# Patient Record
Sex: MALE | Race: WHITE | URBAN - METROPOLITAN AREA
[De-identification: names, ages, dates, MRNs, and addresses within clinical notes are randomized per-mention and may not be internally consistent; named-entity substitution may affect disease eponyms.]

---

## 2021-07-23 ENCOUNTER — PROCEDURE VISIT (OUTPATIENT)
Dept: NEUROLOGY | Facility: CLINIC | Age: 26
End: 2021-07-23
Payer: COMMERCIAL

## 2021-07-23 ENCOUNTER — OFFICE VISIT (OUTPATIENT)
Dept: SURGERY | Facility: CLINIC | Age: 26
End: 2021-07-23
Payer: COMMERCIAL

## 2021-07-23 ENCOUNTER — OFFICE VISIT (OUTPATIENT)
Dept: NEUROLOGY | Facility: CLINIC | Age: 26
End: 2021-07-23
Payer: COMMERCIAL

## 2021-07-23 VITALS
WEIGHT: 169 LBS | DIASTOLIC BLOOD PRESSURE: 72 MMHG | HEIGHT: 67 IN | SYSTOLIC BLOOD PRESSURE: 116 MMHG | BODY MASS INDEX: 26.53 KG/M2

## 2021-07-23 VITALS — HEART RATE: 72 BPM | SYSTOLIC BLOOD PRESSURE: 116 MMHG | DIASTOLIC BLOOD PRESSURE: 54 MMHG

## 2021-07-23 DIAGNOSIS — R29.898 WEAKNESS OF BOTH LOWER EXTREMITIES: Primary | ICD-10-CM

## 2021-07-23 DIAGNOSIS — R29.898 LEG WEAKNESS, BILATERAL: ICD-10-CM

## 2021-07-23 DIAGNOSIS — M47.14 THORACIC MYELOPATHY: ICD-10-CM

## 2021-07-23 DIAGNOSIS — G95.9 CERVICAL MYELOPATHY WITH CERVICAL RADICULOPATHY (HCC): ICD-10-CM

## 2021-07-23 DIAGNOSIS — R20.2 NUMBNESS AND TINGLING OF FOOT: ICD-10-CM

## 2021-07-23 DIAGNOSIS — M48.062 SPINAL STENOSIS OF LUMBAR REGION WITH NEUROGENIC CLAUDICATION: ICD-10-CM

## 2021-07-23 DIAGNOSIS — R26.9 NEUROLOGIC GAIT DYSFUNCTION: ICD-10-CM

## 2021-07-23 DIAGNOSIS — R20.0 NUMBNESS AND TINGLING OF FOOT: ICD-10-CM

## 2021-07-23 DIAGNOSIS — G61.81 CIDP (CHRONIC INFLAMMATORY DEMYELINATING POLYNEUROPATHY) (HCC): Primary | ICD-10-CM

## 2021-07-23 DIAGNOSIS — M54.12 CERVICAL MYELOPATHY WITH CERVICAL RADICULOPATHY (HCC): ICD-10-CM

## 2021-07-23 PROCEDURE — 99204 OFFICE O/P NEW MOD 45 MIN: CPT | Performed by: OTHER

## 2021-07-23 PROCEDURE — 99205 OFFICE O/P NEW HI 60 MIN: CPT | Performed by: PHYSICIAN ASSISTANT

## 2021-07-23 PROCEDURE — 3074F SYST BP LT 130 MM HG: CPT | Performed by: PHYSICIAN ASSISTANT

## 2021-07-23 PROCEDURE — 3008F BODY MASS INDEX DOCD: CPT | Performed by: PHYSICIAN ASSISTANT

## 2021-07-23 PROCEDURE — 3078F DIAST BP <80 MM HG: CPT | Performed by: OTHER

## 2021-07-23 PROCEDURE — 3074F SYST BP LT 130 MM HG: CPT | Performed by: OTHER

## 2021-07-23 PROCEDURE — 3078F DIAST BP <80 MM HG: CPT | Performed by: PHYSICIAN ASSISTANT

## 2021-07-23 PROCEDURE — 95886 MUSC TEST DONE W/N TEST COMP: CPT | Performed by: OTHER

## 2021-07-23 PROCEDURE — 95910 NRV CNDJ TEST 7-8 STUDIES: CPT | Performed by: OTHER

## 2021-07-23 RX ORDER — MIRTAZAPINE 15 MG/1
15 TABLET, FILM COATED ORAL NIGHTLY
COMMUNITY
Start: 2021-02-24

## 2021-07-23 RX ORDER — GABAPENTIN 100 MG/1
100 CAPSULE ORAL
COMMUNITY

## 2021-07-23 RX ORDER — CINACALCET 30 MG/1
1 TABLET, FILM COATED ORAL
COMMUNITY
Start: 2021-01-21

## 2021-07-23 RX ORDER — SERTRALINE HYDROCHLORIDE 100 MG/1
TABLET, FILM COATED ORAL
COMMUNITY
Start: 2020-07-07

## 2021-07-23 RX ORDER — MYCOPHENOLIC ACID 360 MG/1
1 TABLET, DELAYED RELEASE ORAL
COMMUNITY
Start: 2020-08-10

## 2021-07-23 RX ORDER — POTASSIUM CHLORIDE 20 MEQ/1
TABLET, EXTENDED RELEASE ORAL
COMMUNITY
Start: 2019-08-25

## 2021-07-23 RX ORDER — SEVELAMER CARBONATE 800 MG/1
3 TABLET, FILM COATED ORAL
COMMUNITY
Start: 2020-09-11

## 2021-07-23 RX ORDER — PREDNISONE 20 MG/1
60 TABLET ORAL DAILY
Qty: 90 TABLET | Refills: 1 | Status: SHIPPED | OUTPATIENT
Start: 2021-07-23 | End: 2021-09-13

## 2021-07-23 RX ORDER — ARIPIPRAZOLE 10 MG/1
TABLET ORAL
COMMUNITY
Start: 2020-06-07

## 2021-07-23 RX ORDER — FAMOTIDINE 20 MG/1
1 TABLET ORAL
COMMUNITY
Start: 2021-01-19

## 2021-07-23 RX ORDER — BUSPIRONE HYDROCHLORIDE 15 MG/1
TABLET ORAL
COMMUNITY
Start: 2019-07-02

## 2021-07-23 RX ORDER — CALCITRIOL 0.25 UG/1
0.5 CAPSULE, LIQUID FILLED ORAL DAILY
COMMUNITY
Start: 2020-11-23

## 2021-07-23 RX ORDER — BUPROPION HYDROCHLORIDE 300 MG/1
300 TABLET ORAL DAILY
COMMUNITY
Start: 2021-06-22
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
## 2021-07-23 NOTE — PROGRESS NOTES
Patient states legs, knees and feet are very weak. He is using a cane. This started in April 2021 after his first COVID shot. N/t in toes and toes are very sensitive. Denies pain. He is falling 3-5 times per day. He has not had any imaging done.

## 2021-07-23 NOTE — PROGRESS NOTES
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  He has a lot of instability of closing his eyes in the shower. Current Outpatient Medications:   •  predniSONE 20 MG Oral Tab, Take 3 tablets (60 mg total) by mouth daily. , Disp: 90 tablet, Rfl: 1  •  ARIPiprazole 10 MG Oral Tab, Take by malcolm List:     CIDP (chronic inflammatory demyelinating polyneuropathy) (HCC)     Leg weakness, bilateral      Objective:   07/23/21  1110   BP: 116/54   Pulse: 72   , There is no height or weight on file to calculate BMI.     Review of Systems   General: no acu KAPPA/LAMBDA QUANT FREE LIGHT CHAINS W/RATIO,SERUM; Future  - EMG ([facility] [place])  - PHYSICAL THERAPY EXTERNAL  - VITAMIN B12; Future  - MRI SPINE LUMBAR (W+WO) (CPT=94567); Future    2.  Leg weakness, bilateral  - MRI SPINE LUMBAR

## 2021-07-23 NOTE — PATIENT INSTRUCTIONS
Refill policies:    • Allow 2-3 business days for refills; controlled substances may take longer.   • Contact your pharmacy at least 5 days prior to running out of medication and have them send an electronic request or submit request through the “request re Depending on your insurance carrier, approval may take 3-10 days. It is highly recommended patients contact their insurance carrier directly to determine coverage.   If test is done without insurance authorization, patient may be responsible for the entire given appointment at 36 today in [place] to see our neurologist  -He may need a EMG of the lower extremities.

## 2021-07-23 NOTE — PATIENT INSTRUCTIONS
Refill policies:    • Allow 2-3 business days for refills; controlled substances may take longer.   • Contact your pharmacy at least 5 days prior to running out of medication and have them send an electronic request or submit request through the “request re Depending on your insurance carrier, approval may take 3-10 days. It is highly recommended patients contact their insurance carrier directly to determine coverage.   If test is done without insurance authorization, patient may be responsible for the entire inflammatory demyelinating polyradiculoneuropathy (AIDP). It is a neurological disorder in which the body's immune system attacks the peripheral nervous system. This is the part of the nervous system outside the brain and spinal cord.  The onset of GBS can Your blood pressure and heart function are affected. GBS is a medical emergency and needs to be treated promptly. Symptoms can become life-threatening. And even with the best care, there is a small chance that a person may die.  But most people have nearly Plasmapheresis is a procedure that removes the plasma (liquid part of the blood) and replaces it with other fluids. Antibodies are also removed with the plasma, which is thought to help reduce the symptoms of the disease.   Immunoglobulin is a blood produ results could mean. Know what to expect if you do not take the medicine or have the test or procedure. If you have a follow-up appointment, write down the date, time, and purpose for that visit.   Know how you can contact your provider if you have questio

## 2021-07-27 NOTE — PROCEDURES
[facility] with [address]  [phone]    Fax  [phone]    Electrophysiologic Consultation      Full Name:    Osmel Sequeira evidence of a right-sided lumbosacral radiculopathy. Motor NCS      Nerve / Sites Rec.  Site Lat Amp Rel Amp Segments Dist Velocity     ms mV %  cm m/s   R COMM PERONEAL - EDB      Ankle EDB NR NR NR Ankle - EDB 8    L COMM PERONEAL - EDB      Ankle E

## 2021-07-29 ENCOUNTER — TELEPHONE (OUTPATIENT)
Dept: NEUROLOGY | Facility: CLINIC | Age: 26
End: 2021-07-29

## 2021-07-29 DIAGNOSIS — G61.81 CIDP (CHRONIC INFLAMMATORY DEMYELINATING POLYNEUROPATHY) (HCC): Primary | ICD-10-CM

## 2021-07-29 NOTE — TELEPHONE ENCOUNTER
DOS 07/28/21  Hemoglobin A 1c  Protein  Vitamin B12  Lab Result Placed on the Nurses Bin   A Copy was sent to Scan

## 2021-07-29 NOTE — TELEPHONE ENCOUNTER
Received labs from [facility]. Copy made and sent to scan. Original placed in nurses bin. Please advise.

## 2021-07-29 NOTE — TELEPHONE ENCOUNTER
DOS:  7/28/21  Protein Electrophoresis Path Review  Immunofixation  Protein Electrophoresis,Serum  Lab results placed in nurses bin for review.   Copy to scanning

## 2021-07-29 NOTE — TELEPHONE ENCOUNTER
Results received. Hemoglobin A 1 C = 5.3    Vitamin B12 = 299    Total Protein = 5.4    Placed on provider desk for review.

## 2021-07-30 NOTE — TELEPHONE ENCOUNTER
RN spoke to Modustri (ok per HIPPA) and was informed she went to schedule the MRI's and was not able to.      RN to call Neurosurgery and cancel Dr. Haydee Zapien orders so we can order the MRI's without contrast.     Jackie beyer requested we call and leave a m

## 2021-07-30 NOTE — TELEPHONE ENCOUNTER
RN LM for mom that the MRI's are ordered with out contrast. Advised we close at 1:00 and can call the [facility] office or call on Monday morning with any questions.

## 2021-08-06 ENCOUNTER — HOSPITAL ENCOUNTER (OUTPATIENT)
Dept: MRI IMAGING | Age: 26
Discharge: HOME | End: 2021-08-06
Attending: Other
Payer: COMMERCIAL

## 2021-08-06 DIAGNOSIS — G61.81 CIDP (CHRONIC INFLAMMATORY DEMYELINATING POLYNEUROPATHY) (HCC): ICD-10-CM

## 2021-08-06 PROCEDURE — 70551 MRI BRAIN STEM W/O DYE: CPT | Performed by: OTHER

## 2021-08-12 ENCOUNTER — HOSPITAL ENCOUNTER (OUTPATIENT)
Dept: MRI IMAGING | Facility: HOSPITAL | Age: 26
Discharge: HOME | End: 2021-08-12
Attending: Other
Payer: COMMERCIAL

## 2021-08-12 DIAGNOSIS — G61.81 CIDP (CHRONIC INFLAMMATORY DEMYELINATING POLYNEUROPATHY) (HCC): ICD-10-CM

## 2021-08-12 PROCEDURE — 72148 MRI LUMBAR SPINE W/O DYE: CPT | Performed by: OTHER

## 2021-08-12 PROCEDURE — 72141 MRI NECK SPINE W/O DYE: CPT | Performed by: OTHER

## 2021-08-12 PROCEDURE — 72146 MRI CHEST SPINE W/O DYE: CPT | Performed by: OTHER

## 2021-08-13 ENCOUNTER — OFFICE VISIT (OUTPATIENT)
Dept: NEUROLOGY | Facility: CLINIC | Age: 26
End: 2021-08-13
Payer: COMMERCIAL

## 2021-08-13 VITALS
RESPIRATION RATE: 16 BRPM | HEART RATE: 78 BPM | DIASTOLIC BLOOD PRESSURE: 65 MMHG | BODY MASS INDEX: 26.53 KG/M2 | WEIGHT: 169 LBS | HEIGHT: 67 IN | SYSTOLIC BLOOD PRESSURE: 122 MMHG

## 2021-08-13 DIAGNOSIS — G61.81 CIDP (CHRONIC INFLAMMATORY DEMYELINATING POLYNEUROPATHY) (HCC): Primary | ICD-10-CM

## 2021-08-13 PROCEDURE — 3074F SYST BP LT 130 MM HG: CPT | Performed by: OTHER

## 2021-08-13 PROCEDURE — 3078F DIAST BP <80 MM HG: CPT | Performed by: OTHER

## 2021-08-13 PROCEDURE — 3008F BODY MASS INDEX DOCD: CPT | Performed by: OTHER

## 2021-08-13 PROCEDURE — 99214 OFFICE O/P EST MOD 30 MIN: CPT | Performed by: OTHER
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
## 2021-09-13 RX ORDER — PREDNISONE 20 MG/1
TABLET ORAL
Qty: 90 TABLET | Refills: 1 | Status: SHIPPED | OUTPATIENT
Start: 2021-09-13 | End: 2021-11-08

## 2021-09-13 NOTE — TELEPHONE ENCOUNTER
Medication: Prednisone 20 mg    Date of last refill:07/23/21 with 1 addt refill  [place]     Last office visit:08/13/21  Due back to clinic per last office note:  RTN in 3 months  Date next office visit scheduled:     Fut

## 2021-10-07 ENCOUNTER — TELEPHONE (OUTPATIENT)
Dept: NEUROLOGY | Facility: CLINIC | Age: 26
End: 2021-10-07

## 2021-10-07 NOTE — TELEPHONE ENCOUNTER
RN spoke to the patient and advised him he should receive the Flu shot. Pt verbalized understanding and did not have any further questions.

## 2021-10-07 NOTE — TELEPHONE ENCOUNTER
Patient is concerned about his recent history of adverse reactions to the Covid vaccine- wondering if he should receive the flu shot. Patient stated 1st Covid Vaccine his legs became weak and after the 2nd Covid Vaccine the legs became even weaker. Please advise if the patient should receive the flu shot.

## 2021-10-07 NOTE — TELEPHONE ENCOUNTER
Pt had a reaction to the COVID vaccine and is asking if it's ok to get the flu shot?     His nephrologist recommended he check with neurology first.

## 2021-11-08 DIAGNOSIS — G61.81 CIDP (CHRONIC INFLAMMATORY DEMYELINATING POLYNEUROPATHY) (HCC): Primary | ICD-10-CM

## 2021-11-08 RX ORDER — PREDNISONE 20 MG/1
TABLET ORAL
Qty: 90 TABLET | Refills: 1 | Status: SHIPPED | OUTPATIENT
Start: 2021-11-08

## 2021-11-08 NOTE — TELEPHONE ENCOUNTER
Medication: prednisone 20 mg oral tab     Date of last refill: 9/13/2021 (#90/1)  [place]     Last office visit: 8/13/2021 for   Due back to clinic per last office note:  3 mos  Date next office visit scheduled:     [place]

## 2021-11-12 ENCOUNTER — OFFICE VISIT (OUTPATIENT)
Dept: NEUROLOGY | Facility: CLINIC | Age: 26
End: 2021-11-12
Payer: COMMERCIAL

## 2021-11-12 VITALS
RESPIRATION RATE: 16 BRPM | WEIGHT: 169 LBS | HEIGHT: 67 IN | SYSTOLIC BLOOD PRESSURE: 120 MMHG | HEART RATE: 76 BPM | DIASTOLIC BLOOD PRESSURE: 67 MMHG | BODY MASS INDEX: 26.53 KG/M2

## 2021-11-12 DIAGNOSIS — G61.81 CIDP (CHRONIC INFLAMMATORY DEMYELINATING POLYNEUROPATHY) (HCC): Primary | ICD-10-CM

## 2021-11-12 PROCEDURE — 3074F SYST BP LT 130 MM HG: CPT | Performed by: OTHER

## 2021-11-12 PROCEDURE — 3078F DIAST BP <80 MM HG: CPT | Performed by: OTHER

## 2021-11-12 PROCEDURE — 99214 OFFICE O/P EST MOD 30 MIN: CPT | Performed by: OTHER

## 2021-11-12 PROCEDURE — 3008F BODY MASS INDEX DOCD: CPT | Performed by: OTHER
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
## 2021-12-07 ENCOUNTER — PROCEDURE VISIT (OUTPATIENT)
Dept: NEUROLOGY | Facility: CLINIC | Age: 26
End: 2021-12-07
Payer: COMMERCIAL

## 2021-12-07 PROCEDURE — 95911 NRV CNDJ TEST 9-10 STUDIES: CPT | Performed by: OTHER

## 2021-12-07 NOTE — PROGRESS NOTES
[facility] with [address]  [phone]      fax  [phone]      Patient: Leland Graham Height: 5 feet 7 inch  Patient ID: [id number] 38.5                 Sensory NCS      Nerve / Sites OnsetLat P-P Amp Resp. Dist Khai    ms µV  cm m/s   R RADIAL - Thumb   1. Forearm 1.95 36.5  10 51.3   2.  1.80 36.8  10 666.7   R SURAL - Lat Mall   1.  Calf 4.00 9.8      2.  4.00 8.0      3.  3.95 8.3

## 2021-12-10 ENCOUNTER — OFFICE VISIT (OUTPATIENT)
Dept: NEUROLOGY | Facility: CLINIC | Age: 26
End: 2021-12-10
Payer: COMMERCIAL

## 2021-12-10 VITALS
HEART RATE: 88 BPM | RESPIRATION RATE: 16 BRPM | DIASTOLIC BLOOD PRESSURE: 84 MMHG | SYSTOLIC BLOOD PRESSURE: 130 MMHG | HEIGHT: 67 IN | WEIGHT: 169 LBS | BODY MASS INDEX: 26.53 KG/M2

## 2021-12-10 DIAGNOSIS — N18.9 UREMIC NEUROPATHY (HCC): Primary | ICD-10-CM

## 2021-12-10 DIAGNOSIS — G63 UREMIC NEUROPATHY (HCC): Primary | ICD-10-CM

## 2021-12-10 PROCEDURE — 3079F DIAST BP 80-89 MM HG: CPT | Performed by: OTHER

## 2021-12-10 PROCEDURE — 99213 OFFICE O/P EST LOW 20 MIN: CPT | Performed by: OTHER

## 2021-12-10 PROCEDURE — 3008F BODY MASS INDEX DOCD: CPT | Performed by: OTHER

## 2021-12-10 PROCEDURE — 3075F SYST BP GE 130 - 139MM HG: CPT | Performed by: OTHER

## 2021-12-10 RX ORDER — PREDNISONE 10 MG/1
10 TABLET ORAL DAILY
Qty: 90 TABLET | Refills: 0 | Status: SHIPPED | OUTPATIENT
Start: 2021-12-10

## 2021-12-10 NOTE — PROGRESS NOTES
Neurology Note    12/10/2021  9:49 AM    [facility]  32year old, male  3/27/1995  Chief Complaint: numbness in feet    HPI/Subjective:    12/10  -toes still feel tight and has trouble getting his shoes on without sitting down.   -feels numb but there' chest pain and palpitations. Negative for leg swelling. Gastrointestinal: Negative for constipation, diarrhea and nausea. Musculoskeletal: Positive for gait problem. Negative for back pain. No falls for the last few months.  Not using any assisti , Rfl: 1    Patient Active Problem List:     CIDP (chronic inflammatory demyelinating polyneuropathy) ([facility])     Leg weakness, bilateral      Objective:   12/10/21  0949   BP: 130/84   Pulse: 88   Resp: 16   Weight: 169 lb (76.7 kg)   Height: 67\"   , Body 52 High          CREATININE   0.6 - 1.4 MG/DL 10.84 High          GLUCOSE   70 - 100 MG/DL 89         ALBUMIN   3.6 - 5.0 GM/DL 3.8         PROTEIN, TOTAL   6.5 - 8.3 GM/DL 6.6         CALCIUM   8.9 - 10.3 MG/DL 9.8         ALKALINE PHOSPHATASE   30 - 110 dose.

## 2021-12-10 NOTE — PATIENT INSTRUCTIONS
Refill policies:    • Allow 2-3 business days for refills; controlled substances may take longer.   • Contact your pharmacy at least 5 days prior to running out of medication and have them send an electronic request or submit request through the “request re Depending on your insurance carrier, approval may take 3-10 days. It is highly recommended patients contact their insurance carrier directly to determine coverage.   If test is done without insurance authorization, patient may be responsible for the entire experience any issues during the taper.  Please call me in about 2 months

## 2022-02-21 RX ORDER — PREDNISONE 20 MG/1
TABLET ORAL
Qty: 90 TABLET | Refills: 0 | Status: SHIPPED | OUTPATIENT
Start: 2022-02-21
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
## 2022-03-02 ENCOUNTER — DOCUMENTATION ONLY (OUTPATIENT)
Dept: NEUROLOGY | Facility: CLINIC | Age: 27
End: 2022-03-02

## 2022-03-02 RX ORDER — PREDNISONE 10 MG/1
TABLET ORAL
Qty: 210 TABLET | Refills: 0 | Status: SHIPPED | OUTPATIENT
Start: 2022-03-02 | End: 2022-05-01

## 2022-03-02 NOTE — PROGRESS NOTES
Reducing dose to prednisone 40 mg daily x 1 month then 30 mg daily x 1 month. Called patient with recommendation who voiced understanding.

## 2022-03-17 RX ORDER — PREDNISONE 20 MG/1
TABLET ORAL
Qty: 90 TABLET | Refills: 0 | OUTPATIENT
Start: 2022-03-17

## 2022-05-02 RX ORDER — PREDNISONE 20 MG/1
TABLET ORAL
Qty: 75 TABLET | Refills: 0 | Status: SHIPPED | OUTPATIENT
Start: 2022-05-02 | End: 2022-07-01

## 2022-05-17 ENCOUNTER — OFFICE VISIT (OUTPATIENT)
Dept: NEUROLOGY | Facility: CLINIC | Age: 27
End: 2022-05-17
Payer: COMMERCIAL

## 2022-05-17 VITALS
WEIGHT: 169 LBS | DIASTOLIC BLOOD PRESSURE: 66 MMHG | HEART RATE: 93 BPM | HEIGHT: 67 IN | RESPIRATION RATE: 16 BRPM | SYSTOLIC BLOOD PRESSURE: 104 MMHG | BODY MASS INDEX: 26.53 KG/M2

## 2022-05-17 DIAGNOSIS — G63 UREMIC NEUROPATHY (HCC): Primary | ICD-10-CM

## 2022-05-17 DIAGNOSIS — N18.9 UREMIC NEUROPATHY (HCC): Primary | ICD-10-CM

## 2022-05-17 PROCEDURE — 3008F BODY MASS INDEX DOCD: CPT | Performed by: OTHER

## 2022-05-17 PROCEDURE — 3074F SYST BP LT 130 MM HG: CPT | Performed by: OTHER

## 2022-05-17 PROCEDURE — 99212 OFFICE O/P EST SF 10 MIN: CPT | Performed by: OTHER

## 2022-05-17 PROCEDURE — 3078F DIAST BP <80 MM HG: CPT | Performed by: OTHER

## 2022-06-01 DIAGNOSIS — G61.81 CIDP (CHRONIC INFLAMMATORY DEMYELINATING POLYNEUROPATHY) (HCC): ICD-10-CM

## 2022-06-02 RX ORDER — PREDNISONE 20 MG/1
TABLET ORAL
Qty: 30 TABLET | Refills: 0 | Status: SHIPPED | OUTPATIENT
Start: 2022-06-02

## 2022-07-02 DIAGNOSIS — G61.81 CIDP (CHRONIC INFLAMMATORY DEMYELINATING POLYNEUROPATHY) (HCC): ICD-10-CM

## 2022-07-05 RX ORDER — PREDNISONE 10 MG/1
TABLET ORAL
Qty: 30 TABLET | Refills: 0 | Status: SHIPPED | OUTPATIENT
Start: 2022-07-05

## 2022-07-29 DIAGNOSIS — G61.81 CIDP (CHRONIC INFLAMMATORY DEMYELINATING POLYNEUROPATHY) (HCC): ICD-10-CM

## 2022-08-01 ENCOUNTER — TELEPHONE (OUTPATIENT)
Dept: NEUROLOGY | Facility: CLINIC | Age: 27
End: 2022-08-01

## 2022-08-01 DIAGNOSIS — G61.81 CIDP (CHRONIC INFLAMMATORY DEMYELINATING POLYNEUROPATHY) (HCC): ICD-10-CM

## 2022-08-01 RX ORDER — PREDNISONE 10 MG/1
TABLET ORAL
Qty: 45 TABLET | Refills: 0 | Status: SHIPPED | OUTPATIENT
Start: 2022-08-01 | End: 2022-09-30

## 2022-08-01 NOTE — TELEPHONE ENCOUNTER
RN received a call from [place] at [facility] to confirm the dosing instructions for the Prednisone. RN informed [place] we would have to check with the provider and get back to her.

## 2022-08-02 NOTE — TELEPHONE ENCOUNTER
Spoke to [facility] pharmacist and gave directions on how provider wants patient to taoer on prednisone.  Verbalized unserstanding

## 2022-11-22 ENCOUNTER — OFFICE VISIT (OUTPATIENT)
Dept: NEUROLOGY | Facility: CLINIC | Age: 27
End: 2022-11-22
Payer: COMMERCIAL

## 2022-11-22 VITALS
WEIGHT: 169 LBS | HEART RATE: 96 BPM | HEIGHT: 67 IN | DIASTOLIC BLOOD PRESSURE: 70 MMHG | BODY MASS INDEX: 26.53 KG/M2 | SYSTOLIC BLOOD PRESSURE: 116 MMHG | RESPIRATION RATE: 16 BRPM

## 2022-11-22 DIAGNOSIS — N18.9 UREMIC NEUROPATHY (HCC): Primary | ICD-10-CM

## 2022-11-22 DIAGNOSIS — G63 UREMIC NEUROPATHY (HCC): Primary | ICD-10-CM

## 2022-11-22 PROCEDURE — 3008F BODY MASS INDEX DOCD: CPT | Performed by: OTHER

## 2022-11-22 PROCEDURE — 99215 OFFICE O/P EST HI 40 MIN: CPT | Performed by: OTHER

## 2022-11-22 PROCEDURE — 3078F DIAST BP <80 MM HG: CPT | Performed by: OTHER

## 2022-11-22 PROCEDURE — 3074F SYST BP LT 130 MM HG: CPT | Performed by: OTHER

## 2023-03-23 ENCOUNTER — OFFICE VISIT (OUTPATIENT)
Dept: NEUROLOGY | Facility: CLINIC | Age: 28
End: 2023-03-23
Payer: COMMERCIAL

## 2023-03-23 VITALS
DIASTOLIC BLOOD PRESSURE: 62 MMHG | SYSTOLIC BLOOD PRESSURE: 98 MMHG | RESPIRATION RATE: 16 BRPM | BODY MASS INDEX: 26.53 KG/M2 | HEIGHT: 67 IN | WEIGHT: 169 LBS | HEART RATE: 95 BPM

## 2023-03-23 DIAGNOSIS — G63 UREMIC NEUROPATHY (HCC): Primary | ICD-10-CM

## 2023-03-23 DIAGNOSIS — N18.9 UREMIC NEUROPATHY (HCC): Primary | ICD-10-CM

## 2023-03-23 PROCEDURE — 3074F SYST BP LT 130 MM HG: CPT | Performed by: OTHER

## 2023-03-23 PROCEDURE — 99213 OFFICE O/P EST LOW 20 MIN: CPT | Performed by: OTHER

## 2023-03-23 PROCEDURE — 3078F DIAST BP <80 MM HG: CPT | Performed by: OTHER

## 2023-03-23 PROCEDURE — 3008F BODY MASS INDEX DOCD: CPT | Performed by: OTHER

## 2023-03-23 RX ORDER — PREDNISONE 1 MG/1
5 TABLET ORAL DAILY
COMMUNITY
Start: 2023-01-24

## 2023-03-23 RX ORDER — GENTAMICIN SULFATE 1 MG/G
1 CREAM TOPICAL DAILY
COMMUNITY
Start: 2023-03-05

## 2023-09-18 ENCOUNTER — OFFICE VISIT (OUTPATIENT)
Dept: NEUROLOGY | Facility: CLINIC | Age: 28
End: 2023-09-18
Payer: COMMERCIAL

## 2023-09-18 VITALS
OXYGEN SATURATION: 100 % | HEIGHT: 67 IN | BODY MASS INDEX: 27.31 KG/M2 | WEIGHT: 174 LBS | DIASTOLIC BLOOD PRESSURE: 61 MMHG | HEART RATE: 77 BPM | SYSTOLIC BLOOD PRESSURE: 103 MMHG

## 2023-09-18 DIAGNOSIS — N18.9 UREMIC NEUROPATHY (HCC): Primary | ICD-10-CM

## 2023-09-18 DIAGNOSIS — G63 UREMIC NEUROPATHY (HCC): Primary | ICD-10-CM

## 2023-09-18 PROCEDURE — 3074F SYST BP LT 130 MM HG: CPT | Performed by: OTHER

## 2023-09-18 PROCEDURE — 3078F DIAST BP <80 MM HG: CPT | Performed by: OTHER

## 2023-09-18 PROCEDURE — 3008F BODY MASS INDEX DOCD: CPT | Performed by: OTHER

## 2023-09-18 PROCEDURE — 99213 OFFICE O/P EST LOW 20 MIN: CPT | Performed by: OTHER

## 2023-09-18 RX ORDER — ARIPIPRAZOLE 5 MG/1
TABLET ORAL
COMMUNITY
Start: 2023-09-02

## 2023-09-18 RX ORDER — HYDROCODONE BITARTRATE AND ACETAMINOPHEN 5; 325 MG/1; MG/1
1 TABLET ORAL EVERY 6 HOURS PRN
COMMUNITY
Start: 2023-09-03 | End: 2023-09-18 | Stop reason: ALTCHOICE

## 2023-09-18 RX ORDER — ERGOCALCIFEROL 1.25 MG/1
1 CAPSULE ORAL WEEKLY
COMMUNITY
Start: 2023-09-05

## 2023-09-18 RX ORDER — FUROSEMIDE 80 MG
80 TABLET ORAL 2 TIMES DAILY
COMMUNITY
Start: 2023-09-08 | End: 2023-09-18 | Stop reason: ALTCHOICE

## 2023-09-18 RX ORDER — VALGANCICLOVIR 450 MG/1
450 TABLET, FILM COATED ORAL
COMMUNITY
Start: 2023-09-18

## 2023-09-18 RX ORDER — FLUCONAZOLE 200 MG/1
200 TABLET ORAL DAILY
COMMUNITY
Start: 2023-09-02 | End: 2023-09-27

## 2023-09-18 RX ORDER — CIPROFLOXACIN 500 MG/1
1 TABLET, FILM COATED ORAL DAILY
COMMUNITY
Start: 2023-06-04

## 2023-09-18 RX ORDER — PSEUDOEPHEDRINE HCL 30 MG
100 TABLET ORAL 3 TIMES DAILY PRN
COMMUNITY
Start: 2023-09-02

## 2023-09-18 RX ORDER — ATOVAQUONE 750 MG/5ML
1500 SUSPENSION ORAL DAILY
COMMUNITY
Start: 2023-09-18

## 2023-09-18 RX ORDER — SODIUM ZIRCONIUM CYCLOSILICATE 10 G/10G
10 POWDER, FOR SUSPENSION ORAL DAILY
COMMUNITY
Start: 2023-09-01

## 2023-09-18 RX ORDER — PREDNISONE 10 MG/1
10 TABLET ORAL DAILY
COMMUNITY
Start: 2023-09-01 | End: 2023-09-18 | Stop reason: ALTCHOICE

## 2023-09-18 RX ORDER — LIDOCAINE HYDROCHLORIDE 20 MG/ML
10 JELLY TOPICAL
COMMUNITY
Start: 2023-09-18 | End: 2023-09-18 | Stop reason: ALTCHOICE

## (undated) DIAGNOSIS — G61.81 CIDP (CHRONIC INFLAMMATORY DEMYELINATING POLYNEUROPATHY) (HCC): ICD-10-CM